# Patient Record
Sex: MALE | URBAN - METROPOLITAN AREA
[De-identification: names, ages, dates, MRNs, and addresses within clinical notes are randomized per-mention and may not be internally consistent; named-entity substitution may affect disease eponyms.]

---

## 2021-08-04 ENCOUNTER — APPOINTMENT (OUTPATIENT)
Dept: URBAN - METROPOLITAN AREA CLINIC 260 | Age: 46
Setting detail: DERMATOLOGY
End: 2021-08-05

## 2021-08-04 VITALS — RESPIRATION RATE: 17 BRPM | WEIGHT: 205 LBS | HEIGHT: 70 IN

## 2021-08-04 DIAGNOSIS — L30.9 DERMATITIS, UNSPECIFIED: ICD-10-CM

## 2021-08-04 DIAGNOSIS — L259 CONTACT DERMATITIS AND OTHER ECZEMA, UNSPECIFIED CAUSE: ICD-10-CM

## 2021-08-04 PROCEDURE — OTHER COUNSELING: OTHER

## 2021-08-04 PROCEDURE — 99202 OFFICE O/P NEW SF 15 MIN: CPT | Mod: 25

## 2021-08-04 PROCEDURE — OTHER BIOPSY BY SHAVE METHOD: OTHER

## 2021-08-04 PROCEDURE — 88305 TISSUE EXAM BY PATHOLOGIST: CPT

## 2021-08-04 PROCEDURE — 96372 THER/PROPH/DIAG INJ SC/IM: CPT | Mod: 59

## 2021-08-04 PROCEDURE — OTHER PRESCRIPTION MEDICATION MANAGEMENT: OTHER

## 2021-08-04 PROCEDURE — OTHER PATHOLOGY BILLING: OTHER

## 2021-08-04 PROCEDURE — OTHER INTRAMUSCULAR KENALOG: OTHER

## 2021-08-04 PROCEDURE — 11102 TANGNTL BX SKIN SINGLE LES: CPT

## 2021-08-04 ASSESSMENT — LOCATION SIMPLE DESCRIPTION DERM
LOCATION SIMPLE: RIGHT FOREARM
LOCATION SIMPLE: LEFT PRETIBIAL REGION
LOCATION SIMPLE: RIGHT PRETIBIAL REGION
LOCATION SIMPLE: RIGHT BUTTOCK
LOCATION SIMPLE: ABDOMEN
LOCATION SIMPLE: LEFT FOREARM
LOCATION SIMPLE: CHEST

## 2021-08-04 ASSESSMENT — LOCATION DETAILED DESCRIPTION DERM
LOCATION DETAILED: LEFT VENTRAL PROXIMAL FOREARM
LOCATION DETAILED: RIGHT BUTTOCK
LOCATION DETAILED: RIGHT LATERAL ABDOMEN
LOCATION DETAILED: RIGHT DISTAL DORSAL FOREARM
LOCATION DETAILED: RIGHT DISTAL PRETIBIAL REGION
LOCATION DETAILED: RIGHT VENTRAL DISTAL FOREARM
LOCATION DETAILED: RIGHT MEDIAL INFERIOR CHEST
LOCATION DETAILED: LEFT PROXIMAL PRETIBIAL REGION

## 2021-08-04 ASSESSMENT — LOCATION ZONE DERM
LOCATION ZONE: ARM
LOCATION ZONE: TRUNK
LOCATION ZONE: LEG

## 2021-08-04 ASSESSMENT — BSA RASH: BSA RASH: 15

## 2021-08-04 NOTE — PROCEDURE: PATHOLOGY BILLING
Immunohistochemistry (87935 and [id number]) billing is not performed here. Please use the Immunohistochemistry Stain Billing plan to accomplish this. Immunohistochemistry ([id number] and [id number]) billing is not performed here. Please use the Immunohistochemistry Stain Billing plan to accomplish this.

## 2021-08-04 NOTE — HPI: RASH
How Severe Is Your Rash?: severe
Is This A New Presentation, Or A Follow-Up?: Rash
Additional History: July 20th rash started on leg as one small red spot. Went to the Urgent care July 22 and was given prednisone. Took that for 1 week. Also told to take Claritin. The prednisone did not really help much and perhaps the claritin helped a little. Tested negative for shingles, covid test was negative. \\King went to PT on the 20th.\\nMountain bikes a lot, usually 3-4 times a week, it’s possible he brushed against something.\\King started on amitryptiline  4 mo ago \\nTried benedryl cream, cortisone cream

## 2021-08-04 NOTE — PROCEDURE: PRESCRIPTION MEDICATION MANAGEMENT
Render In Strict Bullet Format?: No
Continue Regimen: Claritin
Plan: It appears to be either a Contact dermatitis or a spongiotic dermatitis. Biopsied to confirm a diagnosis. Given Kenalog injection today to help ease the itching as patient cannot sleep at night and when he does not sleep at night he develops migraines. We will be in touch with him about the results in about a week and let him know if further treatment is needed.
Detail Level: Detailed
Initiate Treatment: Kenalog Injection

## 2021-08-04 NOTE — PROCEDURE: BIOPSY BY SHAVE METHOD
Dressing: bandage
Detail Level: Detailed
Hide Second Anesthesia?: No
Silver Nitrate Text: The wound bed was treated with silver nitrate after the biopsy was performed.
Validate Note Data (See Information Below): Yes
Anesthesia Type: 1% lidocaine with epinephrine
Additional Anesthesia Volume In Cc (Will Not Render If 0): 0
Biopsy Method: Dermablade
Consent: Written consent was obtained and risks were reviewed including but not limited to scarring, infection, bleeding, scabbing, incomplete removal, nerve damage and allergy to anesthesia.
Wound Care: Petrolatum
Cryotherapy Text: The wound bed was treated with cryotherapy after the biopsy was performed.
Path Notes (To The Dermatopathologist): Rule out contact dermatitis vs irritant dermatitis vs folliculitis vs spongiotic dermatitis
Information: Selecting Yes will display possible errors in your note based on the variables you have selected. This validation is only offered as a suggestion for you. PLEASE NOTE THAT THE VALIDATION TEXT WILL BE REMOVED WHEN YOU FINALIZE YOUR NOTE. IF YOU WANT TO FAX A PRELIMINARY NOTE YOU WILL NEED TO TOGGLE THIS TO 'NO' IF YOU DO NOT WANT IT IN YOUR FAXED NOTE.
Post-Care Instructions: I reviewed with the patient in detail post-care instructions. Patient is to keep the biopsy site dry overnight, and then apply bacitracin twice daily until healed. Patient may apply hydrogen peroxide soaks to remove any crusting.
Anesthesia Volume In Cc (Will Not Render If 0): 0.5
Billing Type: Client Bill
Type Of Destruction Used: Curettage
Electrodesiccation And Curettage Text: The wound bed was treated with electrodesiccation and curettage after the biopsy was performed.
Electrodesiccation Text: The wound bed was treated with electrodesiccation after the biopsy was performed.
Depth Of Biopsy: dermis
Notification Instructions: Patient will be notified of biopsy results. However, patient instructed to call the office if not contacted within 2 weeks.
Curettage Text: The wound bed was treated with curettage after the biopsy was performed.
Biopsy Type: H and E
Hemostasis: Drysol

## 2021-08-13 ENCOUNTER — HOSPITAL ENCOUNTER (EMERGENCY)
Facility: CLINIC | Age: 46
Discharge: HOME | End: 2021-08-13
Attending: EMERGENCY MEDICINE | Admitting: EMERGENCY MEDICINE
Payer: COMMERCIAL

## 2021-08-13 VITALS
OXYGEN SATURATION: 100 % | DIASTOLIC BLOOD PRESSURE: 85 MMHG | RESPIRATION RATE: 14 BRPM | TEMPERATURE: 97.6 F | SYSTOLIC BLOOD PRESSURE: 138 MMHG | HEART RATE: 59 BPM

## 2021-08-13 DIAGNOSIS — R51.9 ACUTE NONINTRACTABLE HEADACHE, UNSPECIFIED HEADACHE TYPE: ICD-10-CM

## 2021-08-13 PROCEDURE — 96375 TX/PRO/DX INJ NEW DRUG ADDON: CPT

## 2021-08-13 PROCEDURE — 250N000011 HC RX IP 250 OP 636: Performed by: EMERGENCY MEDICINE

## 2021-08-13 PROCEDURE — 96361 HYDRATE IV INFUSION ADD-ON: CPT

## 2021-08-13 PROCEDURE — 258N000003 HC RX IP 258 OP 636: Performed by: EMERGENCY MEDICINE

## 2021-08-13 PROCEDURE — 99284 EMERGENCY DEPT VISIT MOD MDM: CPT | Mod: 25

## 2021-08-13 PROCEDURE — 96374 THER/PROPH/DIAG INJ IV PUSH: CPT

## 2021-08-13 RX ORDER — DIPHENHYDRAMINE HYDROCHLORIDE 50 MG/ML
25 INJECTION INTRAMUSCULAR; INTRAVENOUS ONCE
Status: COMPLETED | OUTPATIENT
Start: 2021-08-13 | End: 2021-08-13

## 2021-08-13 RX ORDER — KETOROLAC TROMETHAMINE 15 MG/ML
15 INJECTION, SOLUTION INTRAMUSCULAR; INTRAVENOUS ONCE
Status: COMPLETED | OUTPATIENT
Start: 2021-08-13 | End: 2021-08-13

## 2021-08-13 RX ADMIN — DIPHENHYDRAMINE HYDROCHLORIDE 25 MG: 50 INJECTION INTRAMUSCULAR; INTRAVENOUS at 02:21

## 2021-08-13 RX ADMIN — KETOROLAC TROMETHAMINE 15 MG: 15 INJECTION, SOLUTION INTRAMUSCULAR; INTRAVENOUS at 02:14

## 2021-08-13 RX ADMIN — SODIUM CHLORIDE 1000 ML: 9 INJECTION, SOLUTION INTRAVENOUS at 02:14

## 2021-08-13 RX ADMIN — PROCHLORPERAZINE EDISYLATE 10 MG: 5 INJECTION INTRAMUSCULAR; INTRAVENOUS at 02:17

## 2021-08-13 NOTE — ED TRIAGE NOTES
Pt reports a migraine with aura that started @ 2200 tonight, takes Amitriptyline #@ 2100 every night. Took Sumatryptyline @ 100mg @ 2315 and again @ 0015.   Pt states that this feels like his normal headache but that this is the worse that he has had.  Pt spoke with Nurse line who instructed pt to come to the ED

## 2021-08-13 NOTE — ED PROVIDER NOTES
EMERGENCY DEPARTMENT ENCOUNTER      NAME: Sebastián Kendall  AGE: 46 year old male  YOB: 1975  MRN: [id number]  EVALUATION DATE & TIME: 2021  1:28 AM    PCP: No primary care provider on file.    ED PROVIDER: Jennie Epps D.O.      CHIEF COMPLAINT:  Chief Complaint   Patient presents with     Headache         FINAL IMPRESSION:  1. Acute nonintractable headache, unspecified headache type          ED COURSE & MEDICAL DECISION MAKIN year old male with history of migraine headaches presented to the ED for evaluation of a headache that started earlier this evening. The patient stated the headache felt consistent with his previous migraine headaches only that it is more severe than normal. He denied any new or concerning symptoms associated with the headache this evening. The patient reported that the headache had started to improve after taking sumatriptan at home. He denied any numbness, ting, weakness in his face or extremities, fevers, or neck pain. Upon arrival to the ED the patient was noted to be hemodynamically stable. He did not appear to be in any obvious distress or discomfort at time of his initial evaluation. The patient's physical exam was unremarkable. There were no focal neurologic deficits noted or meningeal signs. Following his initial evaluation the patient was given IV fluids, Toradol, and Compazine to treat his headache.    Short while after receiving the IV fluids, Toradol, and Compazine the patient stated that he was feeling better with improvement noted with both his headache and nausea.  The patient was requesting to return home.  The patient was able to ambulate here without any difficulty.  The patient was informed that his symptoms are likely due to a migraine headache.  The patient was educated on migraine headaches and reassured.  He was instructed to continue taking his home sumatriptan as needed for any further headaches.  Patient was instructed to follow-up with  his primary care physician for reevaluation or to return back to ED sooner for any worsening headaches or any other new or concerning symptoms.    1:48 AM I met with the patient to gather history and to perform my initial exam. I discussed the plan for care while in the Emergency Department. PPE (gloves, glasses, surgical mask) was worn during patient encounters.   2:45 AM I discussed plan for discharge with patient who is agreeable with the plan.      At the conclusion of the encounter I discussed the results of all of the tests and the disposition. The questions were answered. The patient or family acknowledged understanding and was agreeable with the care plan.     MEDICATIONS GIVEN IN THE EMERGENCY:  Medications   0.9% sodium chloride BOLUS (0 mLs Intravenous Stopped 8/13/21 0249)   ketorolac (TORADOL) injection 15 mg (15 mg Intravenous Given 8/13/21 0214)   prochlorperazine (COMPAZINE) injection 10 mg (10 mg Intravenous Given 8/13/21 0217)   diphenhydrAMINE (BENADRYL) injection 25 mg (25 mg Intravenous Given 8/13/21 0221)       NEW PRESCRIPTIONS STARTED AT TODAY'S ER VISIT:  There are no discharge medications for this patient.         =================================================================    HPI  Sebastián Kendall is a 46 year old male who has pertinent medical history of migraines presents for evaluation of headache.     Patient reports at 2200 (~4 hours ago) he had onset of his typical aura, so patient tried to sleep and was awoken around 2300 (~3 hours ago) with a severe headache. The pain felt like someone was kicking him in the head. He had associated light and sound sensitivity. In addition nausea. Patient takes Amitriptyline at 2100, which he does daily. In addition he took 100 mg of Sumatryptyline 2315 (~3 hours ago) and then again at 0015 (~2 hours ago), which patient did not initially get relief, but is now endorsing mild relief. Patient had called the nurse line who recommended patient  present to the ED for evaluation. Denies numbness, tingling, or additional medical concerns or complaints at this time.     Of note, patient mentions he has been getting decreased sleep over the past couple of nights, which is often a trigger for his headaches.       I, Suyapa Vogt am serving as a scribe to document services personally performed by Dr. Jennie Epps DO, based on my observation and the provider's statements to me. I, Dr. Jennie Epps DO attest that Suyapa Vogt is acting in a scribe capacity, has observed my performance of the services and has documented them in accordance with my direction.      REVIEW OF SYSTEMS   GI: Endorses nausea.  Neurologic:Endorses current headache with light and sound sensitivity. Deniesnew weakness, focal weakness.    Remainder of systems reviewed, unless noted in HPI all others negative.      PAST MEDICAL HISTORY:  No past medical history on file.    PAST SURGICAL HISTORY:  No past surgical history on file.        CURRENT MEDICATIONS:    Prior to Admission medications    Not on File         ALLERGIES:  No Known Allergies    FAMILY HISTORY:  No family history on file.    SOCIAL HISTORY:   Social History     Socioeconomic History     Marital status:      Spouse name: Not on file     Number of children: Not on file     Years of education: Not on file     Highest education level: Not on file   Occupational History     Not on file   Tobacco Use     Smoking status: Not on file   Substance and Sexual Activity     Alcohol use: Not on file     Drug use: Not on file     Sexual activity: Not on file   Other Topics Concern     Not on file   Social History Narrative     Not on file     Social Determinants of Health     Financial Resource Strain:      Difficulty of Paying Living Expenses:    Food Insecurity:      Worried About Running Out of Food in the Last Year:      Ran Out of Food in the Last Year:    Transportation Needs:      Lack of Transportation (Medical):       Lack of Transportation (Non-Medical):    Physical Activity:      Days of Exercise per Week:      Minutes of Exercise per Session:    Stress:      Feeling of Stress :    Social Connections:      Frequency of Communication with Friends and Family:      Frequency of Social Gatherings with Friends and Family:      Attends Nondenominational Services:      Active Member of Clubs or Organizations:      Attends Club or Organization Meetings:      Marital Status:    Intimate Partner Violence:      Fear of Current or Ex-Partner:      Emotionally Abused:      Physically Abused:      Sexually Abused:        PHYSICAL EXAM    /85   Pulse 59   Temp 97.6  F (36.4  C) (Oral)   Resp 14   SpO2 100%   General presentation: Alert, Vital signs reviewed. NAD  HENT: ENT inspection is normal. Oropharynx is moist and clear.   Eye: Pupils are equal and reactive to light. EOMI  Neck: The neck is supple, with full ROM, with no evidence of meningismus.  Pulmonary: Currently in no acute respiratory distress. Normal, non labored respirations, the lung sounds are normal with good equal air movement. Clear to auscultation bilaterally.   Circulatory: Regular rate and rhythm. Peripheral pulses are strong and equal. No murmurs, rubs, or gallops.   Abdominal: The abdomen is soft. Nontender. No rigidity, guarding, or rebound. Bowel sounds normal.   Neurologic: Alert, oriented to person, place, and time. No motor deficit. No sensory deficit. Cranial nerves II through XII are intact.  Musculoskeletal: No extremity tenderness. Full range of motion in all extremities. No extremity edema.   Skin: Skin color is normal. No rash. Warm. Dry to touch.        LAB:  All pertinent labs reviewed and interpreted.  Labs Ordered and Resulted from Time of ED Arrival Up to the Time of Departure from the ED - No data to display    RADIOLOGY:  Reviewed all pertinent imaging. Please see official radiology reports  No orders to display         EKG:    None    PROCEDURES:    None      I, Suyapa Vogt, am serving as a scribe to document services personally performed by Dr. Jennie Epps based on my observation and the provider's statements to me. I, Jennie Epps, DO attest that Suyapa Vogt is acting in a scribe capacity, has observed my performance of the services and has documented them in accordance with my direction.    Jennie Epps D.O.  Emergency Medicine  [facility] EMERGENCY ROOM  [address] [facility] [address]  [phone]  Dept: [phone]         Jennie Epps DO  08/13/21 034

## 2021-08-13 NOTE — DISCHARGE INSTRUCTIONS
Your symptoms appear consistent with a migraine headache.  Continue to take your sumatriptan and/or ibuprofen as needed for any further headaches.  Follow-up with your primary care physician for reevaluation as needed or return back to ED sooner for any worsening headaches or any other new or concerning symptoms.

## 2021-08-13 NOTE — ED NOTES
Pt here with migraine.  Reports hx of migraines.  States is photo sensitive.  Nausea present.  Denies emesis.  Increased pain and discomfort with movement.

## 2021-10-06 ENCOUNTER — APPOINTMENT (OUTPATIENT)
Dept: URBAN - METROPOLITAN AREA CLINIC 260 | Age: 46
Setting detail: DERMATOLOGY
End: 2021-10-08

## 2021-10-06 VITALS — WEIGHT: 185 LBS | HEIGHT: 74 IN

## 2021-10-06 DIAGNOSIS — L30.9 DERMATITIS, UNSPECIFIED: ICD-10-CM

## 2021-10-06 PROCEDURE — 99213 OFFICE O/P EST LOW 20 MIN: CPT

## 2021-10-06 PROCEDURE — OTHER PRESCRIPTION MEDICATION MANAGEMENT: OTHER

## 2021-10-06 PROCEDURE — OTHER COUNSELING: OTHER

## 2021-10-06 PROCEDURE — OTHER ADDITIONAL NOTES: OTHER

## 2021-10-06 PROCEDURE — OTHER PRESCRIPTION: OTHER

## 2021-10-06 RX ORDER — TRIAMCINOLONE ACETONIDE 1 MG/G
CREAM TOPICAL BID
Qty: 80 | Refills: 1 | Status: ERX | COMMUNITY
Start: 2021-10-06

## 2021-10-06 ASSESSMENT — LOCATION ZONE DERM
LOCATION ZONE: ARM
LOCATION ZONE: TRUNK

## 2021-10-06 ASSESSMENT — LOCATION SIMPLE DESCRIPTION DERM
LOCATION SIMPLE: RIGHT FOREARM
LOCATION SIMPLE: ABDOMEN

## 2021-10-06 ASSESSMENT — LOCATION DETAILED DESCRIPTION DERM
LOCATION DETAILED: RIGHT LATERAL ABDOMEN
LOCATION DETAILED: RIGHT VENTRAL PROXIMAL FOREARM

## 2021-10-06 NOTE — PROCEDURE: PRESCRIPTION MEDICATION MANAGEMENT
Detail Level: Zone
Initiate Treatment: Tmc cream bid x 2 wks
Render In Strict Bullet Format?: No
Plan: He asked if there was specific testing to prove if this was from gluten or not. Disc that we already did a bx which showed spongiotic dermatitis but that it doesn’t show a specific cause. If he would like to have it tested he should follow up with his pcp.

## 2021-10-06 NOTE — PROCEDURE: ADDITIONAL NOTES
Detail Level: Detailed
Additional Notes: Patient concerned the steroid shot given LOV will make his COVID-19 vaccination less effective. EF reassured him that this would not qualify him for a third covid shot and that it would only lower his immune system temporarily while the steroid was in his system. He verbalized understanding
Render Risk Assessment In Note?: no

## 2021-10-06 NOTE — HPI: RASH
How Severe Is Your Rash?: moderate
Is This A New Presentation, Or A Follow-Up?: Rash
Additional History: Had a work trip Monday or Tuesday. Rash began on Monday 4pm. He thinks that the rash started from a possibly contaminated gluten containing food